# Patient Record
Sex: FEMALE | Race: WHITE | NOT HISPANIC OR LATINO | URBAN - METROPOLITAN AREA
[De-identification: names, ages, dates, MRNs, and addresses within clinical notes are randomized per-mention and may not be internally consistent; named-entity substitution may affect disease eponyms.]

---
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
## 2023-11-17 NOTE — PROGRESS NOTE PEDS - SUBJECTIVE AND OBJECTIVE BOX
Patient requesting refill of Percocet. Patient is a 18y old  Female who presents with a chief complaint of   Interval History:    REVIEW OF SYSTEMS  All review of systems negative, except for those marked:  General:		[] Abnormal:  	[] Night Sweats		[] Fever		[] Weight Loss  Pulmonary/Cough:	[] Abnormal:  Cardiac/Chest Pain:	[] Abnormal:  Gastrointestinal:	[] Abnormal:  Eyes:			[] Abnormal:  ENT:			[] Abnormal:  Dysuria:		[] Abnormal:  Musculoskeletal	:	[] Abnormal:  Endocrine:		[] Abnormal:  Lymph Nodes:		[] Abnormal:  Headache:		[] Abnormal:  Skin:			[] Abnormal:  Allergy/Immune:	[] Abnormal:  Psychiatric:		[] Abnormal:  [] All other review of systems negative  [] Unable to obtain (explain):    Antimicrobials/Immunologic Medications:  ampicillin/sulbactam IV Intermittent - Peds 2000 milliGRAM(s) IV Intermittent every 4 hours      Daily     Daily   Head Circumference:  Vital Signs Last 24 Hrs  T(C): 36.8 (23 Jul 2018 08:00), Max: 37 (22 Jul 2018 20:00)  T(F): 98.2 (23 Jul 2018 08:00), Max: 98.6 (22 Jul 2018 20:00)  HR: 98 (23 Jul 2018 08:00) (81 - 126)  BP: 145/65 (23 Jul 2018 08:00) (128/73 - 145/65)  BP(mean): 74 (23 Jul 2018 08:00) (66 - 98)  RR: 24 (23 Jul 2018 08:00) (24 - 50)  SpO2: 94% (23 Jul 2018 08:00) (94% - 96%)    PHYSICAL EXAM  All physical exam findings normal, except for those marked:  General:	Normal: alert, neither acutely nor chronically ill-appearing, well developed/well   .		nourished, no respiratory distress  .		[] Abnormal:  Eyes		Normal: no conjunctival injection, no discharge, no photophobia, intact   .		extraocular movements, sclera not icteric  .		[] Abnormal:  ENT:		Normal: normal tympanic membranes; external ear normal, nares normal without   .		discharge, no pharyngeal erythema or exudates, no oral mucosal lesions, normal   .		tongue and lips  .		[] Abnormal:  Neck		Normal: supple, full range of motion, no nuchal rigidity  .		[] Abnormal:  Lymph Nodes	Normal: normal size and consistency, non-tender  .		[] Abnormal:  Cardiovascular	Normal: regular rate and variability; Normal S1, S2; No murmur  .		[] Abnormal:  Respiratory	Normal: no wheezing or crackles, bilateral audible breath sounds, no retractions  .		[] Abnormal:  Abdominal	Normal: soft; non-distended; non-tender; no hepatosplenomegaly or masses  .		[] Abnormal:  		Normal: normal external genitalia, no rash  .		[] Abnormal:  Extremities	Normal: FROM x4, no cyanosis or edema, symmetric pulses  .		[] Abnormal:  Skin		Normal: skin intact and not indurated; no rash, no desquamation  .		[] Abnormal:  Neurologic	Normal: alert, oriented as age-appropriate, affect appropriate; no weakness, no   .		facial asymmetry, moves all extremities, normal gait-child older than 18 months  .		[] Abnormal:  Musculoskeletal		Normal: no joint swelling, erythema, or tenderness; full range of motion   .			with no contractures; no muscle tenderness; no clubbing; no cyanosis;   .			no edema  .			[] Abnormal    Respiratory Support:		[] No	[] Yes:  Vasoactive medication infusion:	[] No	[] Yes:  Venous catheters:		[] No	[] Yes:  Bladder catheter:		[] No	[] Yes:  Other catheters or tubes:	[] No	[] Yes:    Lab Results:                        9.6    16.33 )-----------( 1075     ( 22 Jul 2018 13:00 )             29.4     07-22    138  |  101  |  4<L>  ----------------------------<  91  4.0   |  22  |  0.42<L>    Ca    8.2<L>      22 Jul 2018 13:00  Phos  3.5     07-22  Mg     1.9     07-22              MICROBIOLOGY    Culture - Body Fluid with Gram Stain (07.15.18 @ 00:52)    Culture - Body Fluid:   NO ORGANISMS ISOLATED AT 24 HOURS  NO ORGANISMS ISOLATED AT 48 HRS.  NO ORGANISMS ISOLATED AT 72 HRS.  NO GROWTH AFTER 4 DAYS INCUBATION  NO GROWTH AFTER 5 DAYS INCUBATION    Gram Stain:   WBC^White Blood Cells  QNTY CELLS IN GRAM STAIN: MODERATE (3+)  NOS^No Organisms Seen    Specimen Source: PLEURAL FLUID    Culture - Sensi (Special) (07.14.18 @ 22:13)    -  Imipenem: S 0.047    -  Metronidazole/Anaerobe: S 0.125    -  Amoxicillin/Clavulanic Acid: S 0.094    -  Clindamycin: S 0.023    Specimen Source: .Other            [] The patient requires continued monitoring for:  [] Total critical care time spent by attending physician: __ minutes, excluding procedure time Patient is a 18y old  Female who presents with a chief complaint of sore throat, neck pain and back pain.    Interval History:  Weaned off NC at night  Adjusted left chest tube with 600cc output  Walking around the floors    REVIEW OF SYSTEMS  All review of systems negative, except for those marked:  General:		[] Abnormal:  	[] Night Sweats		[] Fever		[] Weight Loss  Pulmonary/Cough:	[] Abnormal:  Cardiac/Chest Pain:	[] Abnormal:  Gastrointestinal:	[] Abnormal:  Eyes:			[] Abnormal:  ENT:			[] Abnormal:  Dysuria:		[] Abnormal:  Musculoskeletal	:	[] Abnormal:  Endocrine:		[] Abnormal:  Lymph Nodes:		[] Abnormal:  Headache:		[] Abnormal:  Skin:			[] Abnormal:  Allergy/Immune:	[] Abnormal:  Psychiatric:		[] Abnormal:  [X] All other review of systems negative  [] Unable to obtain (explain):    Antimicrobials/Immunologic Medications:  ampicillin/sulbactam IV Intermittent - Peds 2000 milliGRAM(s) IV Intermittent every 4 hours      Daily     Daily   Head Circumference:  Vital Signs Last 24 Hrs  T(C): 36.8 (23 Jul 2018 08:00), Max: 37 (22 Jul 2018 20:00)  T(F): 98.2 (23 Jul 2018 08:00), Max: 98.6 (22 Jul 2018 20:00)  HR: 98 (23 Jul 2018 08:00) (81 - 126)  BP: 145/65 (23 Jul 2018 08:00) (128/73 - 145/65)  BP(mean): 74 (23 Jul 2018 08:00) (66 - 98)  RR: 24 (23 Jul 2018 08:00) (24 - 50)  SpO2: 94% (23 Jul 2018 08:00) (94% - 96%)    PHYSICAL EXAM  All physical exam findings normal, except for those marked:  General:	Normal: alert, neither acutely nor chronically ill-appearing, well developed/well   .		nourished  .		[] Abnormal:  Eyes		Normal: no conjunctival injection, no discharge, no photophobia, intact   .		extraocular movements, sclera not icteric  .		[] Abnormal:  ENT:		Normal: normal tympanic membranes; external ear normal, nares normal without   .		discharge, no pharyngeal erythema or exudates, no oral mucosal lesions, normal   .		tongue and lips  .		[] Abnormal:  Neck		Normal: supple, full range of motion, no nuchal rigidity  .		[] Abnormal:  Lymph Nodes	Normal: normal size and consistency, non-tender  .		[] Abnormal:  Cardiovascular	Normal: regular rhythm; Normal S1, S2; No murmur  .		[X] Abnormal: Tachycardic  Respiratory	Normal: no wheezing or crackles,   .		[X] Abnormal: Tachypneic, decreased breath sounds bilaterally  Abdominal	Normal: soft; non-distended; non-tender; no hepatosplenomegaly or masses  .		[] Abnormal:  		Normal: normal external genitalia, no rash  .		[] Abnormal:  Extremities	Normal: FROM x4, no cyanosis or edema, symmetric pulses  .		[] Abnormal:  Skin		Normal: skin intact and not indurated; no rash, no desquamation  .		[] Abnormal:  Neurologic	Normal: alert, oriented as age-appropriate; no weakness, no   .		facial asymmetry, moves all extremities  .		[] Abnormal:  Musculoskeletal		Normal: no joint swelling, erythema, or tenderness; full range of motion   .			with no contractures; no muscle tenderness; no clubbing; no cyanosis;   .			no edema  .			[] Abnormal    Respiratory Support:		[X] No	[] Yes:  Vasoactive medication infusion:	[X] No	[] Yes:  Venous catheters:		[] No	[X] Yes: PIV, PICC  Bladder catheter:		[X] No	[] Yes:  Other catheters or tubes:	[] No	[X] Yes: L chest tube    Lab Results:                        9.6    16.33 )-----------( 1075     ( 22 Jul 2018 13:00 )             29.4     07-22    138  |  101  |  4<L>  ----------------------------<  91  4.0   |  22  |  0.42<L>    Ca    8.2<L>      22 Jul 2018 13:00  Phos  3.5     07-22  Mg     1.9     07-22              MICROBIOLOGY    Culture - Body Fluid with Gram Stain (07.15.18 @ 00:52)    Culture - Body Fluid:   NO ORGANISMS ISOLATED AT 24 HOURS  NO ORGANISMS ISOLATED AT 48 HRS.  NO ORGANISMS ISOLATED AT 72 HRS.  NO GROWTH AFTER 4 DAYS INCUBATION  NO GROWTH AFTER 5 DAYS INCUBATION    Gram Stain:   WBC^White Blood Cells  QNTY CELLS IN GRAM STAIN: MODERATE (3+)  NOS^No Organisms Seen    Specimen Source: PLEURAL FLUID    Culture - Sensi (Special) (07.14.18 @ 22:13)    -  Imipenem: S 0.047    -  Metronidazole/Anaerobe: S 0.125    -  Amoxicillin/Clavulanic Acid: S 0.094    -  Clindamycin: S 0.023    Specimen Source: .Other            [] The patient requires continued monitoring for:  [] Total critical care time spent by attending physician: __ minutes, excluding procedure time Patient is a 18y old  Female who presents with a chief complaint of sore throat, neck pain and back pain.    Interval History:  Weaned off NC at night  Surgery adjusted left chest tube with 600cc output, required pain meds  Walking around the floors    REVIEW OF SYSTEMS  All review of systems negative, except for those marked:  General:		[] Abnormal:  	[] Night Sweats		[] Fever		[] Weight Loss  Pulmonary/Cough:	[x] Abnormal: improved respiratory status, no NC overnight, intermittent coughing, shallow breaths  Cardiac/Chest Pain:	[] Abnormal:  Gastrointestinal:	[] Abnormal:  Eyes:			[] Abnormal:  ENT:			[] Abnormal:  Dysuria:		[] Abnormal:  Musculoskeletal	:	[x] Abnormal: pain at chest tube site  Endocrine:		[] Abnormal:  Lymph Nodes:		[] Abnormal:  Headache:		[] Abnormal:  Skin:			[] Abnormal:  Allergy/Immune:	[] Abnormal:  Psychiatric:		[] Abnormal:  [X] All other review of systems negative  [] Unable to obtain (explain):    Antimicrobials/Immunologic Medications:  ampicillin/sulbactam IV Intermittent - Peds 2000 milliGRAM(s) IV Intermittent every 4 hours      Daily     Daily   Head Circumference:  Vital Signs Last 24 Hrs  T(C): 36.8 (23 Jul 2018 08:00), Max: 37 (22 Jul 2018 20:00)  T(F): 98.2 (23 Jul 2018 08:00), Max: 98.6 (22 Jul 2018 20:00)  HR: 98 (23 Jul 2018 08:00) (81 - 126)  BP: 145/65 (23 Jul 2018 08:00) (128/73 - 145/65)  BP(mean): 74 (23 Jul 2018 08:00) (66 - 98)  RR: 24 (23 Jul 2018 08:00) (24 - 50)  SpO2: 94% (23 Jul 2018 08:00) (94% - 96%)    PHYSICAL EXAM  All physical exam findings normal, except for those marked:  General:	Normal: alert, neither acutely nor chronically ill-appearing, well developed/well   .		nourished  .		[] Abnormal:  Eyes		Normal: no conjunctival injection, no discharge, no photophobia, intact   .		extraocular movements, sclera not icteric  .		[] Abnormal:  ENT:		Normal: normal tympanic membranes; external ear normal, nares normal without   .		discharge, no pharyngeal erythema or exudates, no oral mucosal lesions, normal   .		tongue and lips  .		[] Abnormal:  Neck		Normal: supple, full range of motion, no nuchal rigidity  .		[] Abnormal:  Lymph Nodes	Normal: normal size and consistency, non-tender  .		[] Abnormal:  Cardiovascular	Normal: regular rhythm; Normal S1, S2; No murmur  .		[X] Abnormal: Tachycardic  Respiratory	Normal: no wheezing or crackles,   .		[X] Abnormal: Tachypneic, improved breath sounds on left but still diminished, shallow, quick breaths  Abdominal	Normal: soft; non-distended; non-tender; no hepatosplenomegaly or masses  .		[] Abnormal:  		Normal: normal external genitalia, no rash  .		[] Abnormal:  Extremities	Normal: FROM x4, no cyanosis or edema, symmetric pulses  .		[] Abnormal:  Skin		Normal: skin intact and not indurated; no rash, no desquamation  .		[] Abnormal:  Neurologic	Normal: alert, oriented as age-appropriate; no weakness, no   .		facial asymmetry, moves all extremities  .		[] Abnormal:  Musculoskeletal		Normal: no joint swelling, erythema, or tenderness; full range of motion   .			with no contractures; no muscle tenderness; no clubbing; no cyanosis;   .			no edema  .			[] Abnormal    Respiratory Support:		[X] No	[] Yes:  Vasoactive medication infusion:	[X] No	[] Yes:  Venous catheters:		[] No	[X] Yes: PIV, PICC  Bladder catheter:		[X] No	[] Yes:  Other catheters or tubes:	[] No	[X] Yes: L chest tube    Lab Results:                        9.6    16.33 )-----------( 1075     ( 22 Jul 2018 13:00 )             29.4     07-22    138  |  101  |  4<L>  ----------------------------<  91  4.0   |  22  |  0.42<L>    Ca    8.2<L>      22 Jul 2018 13:00  Phos  3.5     07-22  Mg     1.9     07-22      MICROBIOLOGY    Culture - Body Fluid with Gram Stain (07.15.18 @ 00:52)    Culture - Body Fluid:   NO ORGANISMS ISOLATED AT 24 HOURS  NO ORGANISMS ISOLATED AT 48 HRS.  NO ORGANISMS ISOLATED AT 72 HRS.  NO GROWTH AFTER 4 DAYS INCUBATION  NO GROWTH AFTER 5 DAYS INCUBATION    Gram Stain:   WBC^White Blood Cells  QNTY CELLS IN GRAM STAIN: MODERATE (3+)  NOS^No Organisms Seen    Specimen Source: PLEURAL FLUID    Culture - Sensi (Special) (07.14.18 @ 22:13)    -  Imipenem: S 0.047    -  Metronidazole/Anaerobe: S 0.125    -  Amoxicillin/Clavulanic Acid: S 0.094    -  Clindamycin: S 0.023    Specimen Source: .Other    IMAGING    US CHEST 7-23 15:17    INTERPRETATION:  Indication: Follow-up pleural effusion    Sonography of the right chest demonstrates a multiseptated pleural   effusion which may be slightly decreased from the prior examination.   Evaluation of the left chest demonstrates a multiseptated pleural   effusion without definite change from the prior examination.    Impression: Bilateral multiseptated pleural effusions as above. Further   evaluation with CT examination may be helpful.    CXR 7-23 1:33    Indication: left chest tube    Findings:    Exam is compared to that dated 07/22/2018.    Initial examination dated 07/22/2018 performed at 1:58 AM demonstrates no   change in the course of the left PICC, left chest tube or appearance of   the bilateral pleural effusions with superimposed atelectasis versus   pneumonia, left worse on right. There is loculation of the right effusion   near the right lung apex. There is no evidence of mediastinal shift.    Subsequent examination dated 07/23/2018 at 12:30 AM demonstrates no   significant change.    Impression:    Stable bilateral pleural effusions with superimposed atelectasis versus   pneumonia. There is loculation of the right pleural effusion near the   right lung apex.              [] The patient requires continued monitoring for:  [] Total critical care time spent by attending physician: __ minutes, excluding procedure time Patient is a 18y old  Female who presents with a chief complaint of sore throat, neck pain and back pain.    Interval History:  Weaned off NC at night  Surgery adjusted left chest tube with 600cc output, required pain meds  Walking around the floors    REVIEW OF SYSTEMS  All review of systems negative, except for those marked:  General:		[] Abnormal:  	[] Night Sweats		[] Fever		[] Weight Loss  Pulmonary/Cough:	[x] Abnormal: improved respiratory status, no NC overnight, intermittent coughing, shallow breaths  Cardiac/Chest Pain:	[] Abnormal:  Gastrointestinal:	[] Abnormal:  Eyes:			[] Abnormal:  ENT:			[] Abnormal:  Dysuria:		[] Abnormal:  Musculoskeletal	:	[x] Abnormal: pain at chest tube site  Endocrine:		[] Abnormal:  Lymph Nodes:		[] Abnormal:  Headache:		[] Abnormal:  Skin:			[] Abnormal:  Allergy/Immune:	[] Abnormal:  Psychiatric:		[] Abnormal:  [X] All other review of systems negative  [] Unable to obtain (explain):    Antimicrobials/Immunologic Medications:  ampicillin/sulbactam IV Intermittent - Peds 2000 milliGRAM(s) IV Intermittent every 4 hours      Daily     Daily   Head Circumference:  Vital Signs Last 24 Hrs  T(C): 36.8 (23 Jul 2018 08:00), Max: 37 (22 Jul 2018 20:00)  T(F): 98.2 (23 Jul 2018 08:00), Max: 98.6 (22 Jul 2018 20:00)  HR: 98 (23 Jul 2018 08:00) (81 - 126)  BP: 145/65 (23 Jul 2018 08:00) (128/73 - 145/65)  BP(mean): 74 (23 Jul 2018 08:00) (66 - 98)  RR: 24 (23 Jul 2018 08:00) (24 - 50)  SpO2: 94% (23 Jul 2018 08:00) (94% - 96%)    PHYSICAL EXAM  All physical exam findings normal, except for those marked:  General:	Normal: alert, neither acutely nor chronically ill-appearing, well developed/well   .		nourished  .		[] Abnormal:  Eyes		Normal: no conjunctival injection, no discharge, no photophobia, intact   .		extraocular movements, sclera not icteric  .		[] Abnormal:  ENT:		Normal: normal tympanic membranes; external ear normal, nares normal without   .		discharge, no pharyngeal erythema or exudates, no oral mucosal lesions, normal   .		tongue and lips  .		[] Abnormal:  Neck		Normal: supple, full range of motion, no nuchal rigidity  .		[] Abnormal:  Lymph Nodes	Normal: normal size and consistency, non-tender  .		[] Abnormal:  Cardiovascular	Normal: regular rhythm; Normal S1, S2; No murmur  .		[X] Abnormal: Tachycardic  Respiratory	Normal: no wheezing or crackles,   .		[X] Abnormal: Tachypneic, improved breath sounds on left but still diminished, shallow, quick breaths  Abdominal	Normal: soft; non-distended; non-tender; no hepatosplenomegaly or masses  .		[] Abnormal:  		Normal: normal external genitalia, no rash  .		[] Abnormal:  Extremities	Normal: FROM x4, no cyanosis or edema, symmetric pulses  .		[] Abnormal:  Skin		Normal: skin intact and not indurated; no rash, no desquamation  .		[] Abnormal:  Neurologic	Normal: alert, oriented as age-appropriate; no weakness, no   .		facial asymmetry, moves all extremities  .		[] Abnormal:  Musculoskeletal		Normal: no joint swelling, erythema, or tenderness; full range of motion   .			with no contractures; no muscle tenderness; no clubbing; no cyanosis;   .			no edema  .			[] Abnormal    Respiratory Support:		[X] No	[] Yes:  Vasoactive medication infusion:	[X] No	[] Yes:  Venous catheters:		[] No	[X] Yes: PIV, PICC  Bladder catheter:		[X] No	[] Yes:  Other catheters or tubes:	[] No	[X] Yes: L chest tube    Lab Results:                        9.6    16.33 )-----------( 1075     ( 22 Jul 2018 13:00 )             29.4     07-22    138  |  101  |  4<L>  ----------------------------<  91  4.0   |  22  |  0.42<L>    Ca    8.2<L>      22 Jul 2018 13:00  Phos  3.5     07-22  Mg     1.9     07-22      MICROBIOLOGY    Culture - Body Fluid with Gram Stain (07.15.18 @ 00:52)    Culture - Body Fluid:   NO ORGANISMS ISOLATED AT 24 HOURS  NO ORGANISMS ISOLATED AT 48 HRS.  NO ORGANISMS ISOLATED AT 72 HRS.  NO GROWTH AFTER 4 DAYS INCUBATION  NO GROWTH AFTER 5 DAYS INCUBATION    Gram Stain:   WBC^White Blood Cells  QNTY CELLS IN GRAM STAIN: MODERATE (3+)  NOS^No Organisms Seen    Specimen Source: PLEURAL FLUID    Culture - Sensi (Special) (07.14.18 @ 22:13)    -  Imipenem: S 0.047    -  Metronidazole/Anaerobe: S 0.125    -  Amoxicillin/Clavulanic Acid: S 0.094    -  Clindamycin: S 0.023    Specimen Source: .Other    IMAGING    US CHEST 7-23 15:17    INTERPRETATION:  Indication: Follow-up pleural effusion    Sonography of the right chest demonstrates a multiseptated pleural   effusion which may be slightly decreased from the prior examination.   Evaluation of the left chest demonstrates a multiseptated pleural   effusion without definite change from the prior examination.    Impression: Bilateral multiseptated pleural effusions as above. Further   evaluation with CT examination may be helpful.    CXR 7-23 1:33    Indication: left chest tube    Findings:    Exam is compared to that dated 07/22/2018.    Initial examination dated 07/22/2018 performed at 1:58 AM demonstrates no   change in the course of the left PICC, left chest tube or appearance of   the bilateral pleural effusions with superimposed atelectasis versus   pneumonia, left worse on right. There is loculation of the right effusion   near the right lung apex. There is no evidence of mediastinal shift.    Subsequent examination dated 07/23/2018 at 12:30 AM demonstrates no   significant change.    Impression:    Stable bilateral pleural effusions with superimposed atelectasis versus   pneumonia. There is loculation of the right pleural effusion near the   right lung apex.    `          [] The patient requires continued monitoring for:  [] Total critical care time spent by attending physician: __ minutes, excluding procedure time Patient is a 18y old  Female who presents with a chief complaint of sore throat, neck pain and back pain.    Interval History:  Weaned off NC at night  Surgery adjusted left chest tube with 600cc output, required pain meds  Walking around the floors    REVIEW OF SYSTEMS  All review of systems negative, except for those marked:  General:		[] Abnormal:  	[] Night Sweats		[] Fever		[] Weight Loss  Pulmonary/Cough:	[x] Abnormal: improved respiratory status, no NC overnight, intermittent coughing, shallow breaths  Cardiac/Chest Pain:	[] Abnormal:  Gastrointestinal:	[] Abnormal:  Eyes:			[] Abnormal:  ENT:			[] Abnormal:  Dysuria:		[] Abnormal:  Musculoskeletal	:	[x] Abnormal: pain at chest tube site  Endocrine:		[] Abnormal:  Lymph Nodes:		[] Abnormal:  Headache:		[] Abnormal:  Skin:			[] Abnormal:  Allergy/Immune:	[] Abnormal:  Psychiatric:		[] Abnormal:  [X] All other review of systems negative  [] Unable to obtain (explain):    Antimicrobials/Immunologic Medications:  ampicillin/sulbactam IV Intermittent - Peds 2000 milliGRAM(s) IV Intermittent every 4 hours      Daily     Daily   Head Circumference:  Vital Signs Last 24 Hrs  T(C): 36.8 (23 Jul 2018 08:00), Max: 37 (22 Jul 2018 20:00)  T(F): 98.2 (23 Jul 2018 08:00), Max: 98.6 (22 Jul 2018 20:00)  HR: 98 (23 Jul 2018 08:00) (81 - 126)  BP: 145/65 (23 Jul 2018 08:00) (128/73 - 145/65)  BP(mean): 74 (23 Jul 2018 08:00) (66 - 98)  RR: 24 (23 Jul 2018 08:00) (24 - 50)  SpO2: 94% (23 Jul 2018 08:00) (94% - 96%)    PHYSICAL EXAM  All physical exam findings normal, except for those marked:  General:	Normal: alert, neither acutely nor chronically ill-appearing, well developed/well   .		nourished  .		[] Abnormal:  Eyes		Normal: no conjunctival injection, no discharge, no photophobia,, sclera not icteric  .		[] Abnormal:  ENT:		Normal:  external ear normal, nares normal without   .		discharge, no pharyngeal erythema or exudates, no oral mucosal lesions, normal   .		tongue and lips  .		[] Abnormal:  Neck		Normal: supple, full range of motion  .		[] Abnormal:  Lymph Nodes	Normal: normal size and consistency, non-tender  .		[] Abnormal:  Cardiovascular	Normal: regular rhythm; Normal S1, S2; No murmur  .		[X] Abnormal: Tachycardic  Respiratory	Normal: no wheezing or crackles,   .		[X] Abnormal: Tachypneic, improved breath sounds on left but still diminished, shallow, quick breaths  Abdominal	Normal: soft; non-distended; non-tender; no hepatosplenomegaly or masses  .		[] Abnormal:  		Normal: normal external genitalia, no rash  .		[] Abnormal:  Extremities	Normal: FROM x4, no cyanosis or edema, symmetric pulses  .		[] Abnormal:  Skin		Normal: skin intact and not indurated; no rash, no desquamation  .		[] Abnormal:  Neurologic	Normal: alert, oriented as age-appropriate; no weakness, no   .		facial asymmetry, moves all extremities  .		[] Abnormal:  Musculoskeletal		Normal: no joint swelling, erythema, or tenderness; full range of motion   .			with no contractures; no muscle tenderness; no clubbing; no cyanosis;   .			no edema  .			[] Abnormal    Respiratory Support:		[X] No	[] Yes:  Vasoactive medication infusion:	[X] No	[] Yes:  Venous catheters:		[] No	[X] Yes: PIV, PICC  Bladder catheter:		[X] No	[] Yes:  Other catheters or tubes:	[] No	[X] Yes: L chest tube    Lab Results:                        9.6    16.33 )-----------( 1075     ( 22 Jul 2018 13:00 )             29.4     07-22    138  |  101  |  4<L>  ----------------------------<  91  4.0   |  22  |  0.42<L>    Ca    8.2<L>      22 Jul 2018 13:00  Phos  3.5     07-22  Mg     1.9     07-22      MICROBIOLOGY    Culture - Body Fluid with Gram Stain (07.15.18 @ 00:52)    Culture - Body Fluid:   NO ORGANISMS ISOLATED AT 24 HOURS  NO ORGANISMS ISOLATED AT 48 HRS.  NO ORGANISMS ISOLATED AT 72 HRS.  NO GROWTH AFTER 4 DAYS INCUBATION  NO GROWTH AFTER 5 DAYS INCUBATION    Gram Stain:   WBC^White Blood Cells  QNTY CELLS IN GRAM STAIN: MODERATE (3+)  NOS^No Organisms Seen    Specimen Source: PLEURAL FLUID    Culture - Sensi (Special) (07.14.18 @ 22:13)    -  Imipenem: S 0.047    -  Metronidazole/Anaerobe: S 0.125    -  Amoxicillin/Clavulanic Acid: S 0.094    -  Clindamycin: S 0.023    Specimen Source: .Other    IMAGING    US CHEST 7-23 15:17    INTERPRETATION:  Indication: Follow-up pleural effusion    Sonography of the right chest demonstrates a multiseptated pleural   effusion which may be slightly decreased from the prior examination.   Evaluation of the left chest demonstrates a multiseptated pleural   effusion without definite change from the prior examination.    Impression: Bilateral multiseptated pleural effusions as above. Further   evaluation with CT examination may be helpful.    CXR 7-23 1:33    Indication: left chest tube    Findings:    Exam is compared to that dated 07/22/2018.    Initial examination dated 07/22/2018 performed at 1:58 AM demonstrates no   change in the course of the left PICC, left chest tube or appearance of   the bilateral pleural effusions with superimposed atelectasis versus   pneumonia, left worse on right. There is loculation of the right effusion   near the right lung apex. There is no evidence of mediastinal shift.    Subsequent examination dated 07/23/2018 at 12:30 AM demonstrates no   significant change.    Impression:    Stable bilateral pleural effusions with superimposed atelectasis versus   pneumonia. There is loculation of the right pleural effusion near the   right lung apex.              [] The patient requires continued monitoring for:  [] Total critical care time spent by attending physician: __ minutes, excluding procedure time